# Patient Record
Sex: FEMALE | Race: WHITE
[De-identification: names, ages, dates, MRNs, and addresses within clinical notes are randomized per-mention and may not be internally consistent; named-entity substitution may affect disease eponyms.]

---

## 2017-02-28 ENCOUNTER — HOSPITAL ENCOUNTER (INPATIENT)
Dept: HOSPITAL 79 - MED SURG 4 | Age: 67
LOS: 1 days | Discharge: HOME | DRG: 643 | End: 2017-03-01
Attending: CLINIC/CENTER | Admitting: CLINIC/CENTER
Payer: COMMERCIAL

## 2017-02-28 DIAGNOSIS — Z98.890: ICD-10-CM

## 2017-02-28 DIAGNOSIS — N17.9: ICD-10-CM

## 2017-02-28 DIAGNOSIS — Q61.02: ICD-10-CM

## 2017-02-28 DIAGNOSIS — Z87.891: ICD-10-CM

## 2017-02-28 DIAGNOSIS — E11.65: ICD-10-CM

## 2017-02-28 DIAGNOSIS — I70.1: ICD-10-CM

## 2017-02-28 DIAGNOSIS — Z79.82: ICD-10-CM

## 2017-02-28 DIAGNOSIS — Z83.3: ICD-10-CM

## 2017-02-28 DIAGNOSIS — I10: ICD-10-CM

## 2017-02-28 DIAGNOSIS — Z79.4: ICD-10-CM

## 2017-02-28 DIAGNOSIS — E53.8: ICD-10-CM

## 2017-02-28 DIAGNOSIS — T43.595A: ICD-10-CM

## 2017-02-28 DIAGNOSIS — Z79.899: ICD-10-CM

## 2017-02-28 DIAGNOSIS — F32.9: ICD-10-CM

## 2017-02-28 DIAGNOSIS — J44.9: ICD-10-CM

## 2017-02-28 DIAGNOSIS — E78.5: ICD-10-CM

## 2017-02-28 DIAGNOSIS — Z79.02: ICD-10-CM

## 2017-02-28 DIAGNOSIS — G93.41: ICD-10-CM

## 2017-02-28 DIAGNOSIS — E22.2: Primary | ICD-10-CM

## 2017-02-28 DIAGNOSIS — I65.29: ICD-10-CM

## 2017-02-28 DIAGNOSIS — Z88.5: ICD-10-CM

## 2017-02-28 DIAGNOSIS — Z79.84: ICD-10-CM

## 2017-02-28 DIAGNOSIS — Z88.3: ICD-10-CM

## 2017-02-28 LAB
BUN/CREATININE RATIO: 13 (ref 0–10)
HGB BLD-MCNC: 11.7 GM/DL (ref 12.3–15.3)
RED BLOOD COUNT: 3.97 M/UL (ref 4–5.1)
WHITE BLOOD COUNT: 9.3 K/UL (ref 4.5–11)

## 2017-03-01 LAB — BUN/CREATININE RATIO: 14 (ref 0–10)

## 2021-10-01 ENCOUNTER — HOSPITAL ENCOUNTER (INPATIENT)
Dept: HOSPITAL 79 - CCU | Age: 71
LOS: 13 days | Discharge: NURSING, REHAB OR LONG-TERM CARE | DRG: 682 | End: 2021-10-14
Attending: INTERNAL MEDICINE | Admitting: INTERNAL MEDICINE
Payer: MEDICARE

## 2021-10-01 VITALS — BODY MASS INDEX: 21.49 KG/M2 | HEIGHT: 65 IN | WEIGHT: 129 LBS

## 2021-10-01 DIAGNOSIS — F20.9: ICD-10-CM

## 2021-10-01 DIAGNOSIS — E87.1: ICD-10-CM

## 2021-10-01 DIAGNOSIS — Z90.710: ICD-10-CM

## 2021-10-01 DIAGNOSIS — I50.9: ICD-10-CM

## 2021-10-01 DIAGNOSIS — I27.20: ICD-10-CM

## 2021-10-01 DIAGNOSIS — R53.2: ICD-10-CM

## 2021-10-01 DIAGNOSIS — Z86.73: ICD-10-CM

## 2021-10-01 DIAGNOSIS — E11.22: ICD-10-CM

## 2021-10-01 DIAGNOSIS — I48.92: ICD-10-CM

## 2021-10-01 DIAGNOSIS — F41.9: ICD-10-CM

## 2021-10-01 DIAGNOSIS — Z79.4: ICD-10-CM

## 2021-10-01 DIAGNOSIS — E87.5: ICD-10-CM

## 2021-10-01 DIAGNOSIS — J96.21: ICD-10-CM

## 2021-10-01 DIAGNOSIS — Z79.82: ICD-10-CM

## 2021-10-01 DIAGNOSIS — I13.0: ICD-10-CM

## 2021-10-01 DIAGNOSIS — Z96.643: ICD-10-CM

## 2021-10-01 DIAGNOSIS — Z79.01: ICD-10-CM

## 2021-10-01 DIAGNOSIS — G20: ICD-10-CM

## 2021-10-01 DIAGNOSIS — E87.2: ICD-10-CM

## 2021-10-01 DIAGNOSIS — A04.72: ICD-10-CM

## 2021-10-01 DIAGNOSIS — F32.9: ICD-10-CM

## 2021-10-01 DIAGNOSIS — J18.9: ICD-10-CM

## 2021-10-01 DIAGNOSIS — Z74.01: ICD-10-CM

## 2021-10-01 DIAGNOSIS — E44.0: ICD-10-CM

## 2021-10-01 DIAGNOSIS — N39.0: ICD-10-CM

## 2021-10-01 DIAGNOSIS — B95.2: ICD-10-CM

## 2021-10-01 DIAGNOSIS — I08.1: ICD-10-CM

## 2021-10-01 DIAGNOSIS — F02.80: ICD-10-CM

## 2021-10-01 DIAGNOSIS — I44.7: ICD-10-CM

## 2021-10-01 DIAGNOSIS — N17.0: Primary | ICD-10-CM

## 2021-10-01 DIAGNOSIS — K21.9: ICD-10-CM

## 2021-10-01 DIAGNOSIS — E78.5: ICD-10-CM

## 2021-10-01 DIAGNOSIS — E87.3: ICD-10-CM

## 2021-10-01 DIAGNOSIS — Z20.822: ICD-10-CM

## 2021-10-01 DIAGNOSIS — N18.30: ICD-10-CM

## 2021-10-01 DIAGNOSIS — A41.81: ICD-10-CM

## 2021-10-01 DIAGNOSIS — D63.1: ICD-10-CM

## 2021-10-01 DIAGNOSIS — I48.91: ICD-10-CM

## 2021-10-01 DIAGNOSIS — L89.152: ICD-10-CM

## 2021-10-01 DIAGNOSIS — D50.9: ICD-10-CM

## 2021-10-01 LAB — BUN/CREATININE RATIO: 13 (ref 0–10)

## 2021-10-01 PROCEDURE — A6212 FOAM DRG <=16 SQ IN W/BORDER: HCPCS

## 2021-10-01 PROCEDURE — P9047 ALBUMIN (HUMAN), 25%, 50ML: HCPCS

## 2021-10-01 PROCEDURE — U0002 COVID-19 LAB TEST NON-CDC: HCPCS

## 2021-10-01 PROCEDURE — P9016 RBC LEUKOCYTES REDUCED: HCPCS

## 2021-10-02 LAB
BUN/CREATININE RATIO: 12 (ref 0–10)
HGB BLD-MCNC: 8.7 GM/DL (ref 12.3–15.3)
RED BLOOD COUNT: 2.85 M/UL (ref 4–5.1)
WHITE BLOOD COUNT: 10.4 K/UL (ref 4.5–11)

## 2021-10-03 LAB
BUN/CREATININE RATIO: 10 (ref 0–10)
HGB BLD-MCNC: 8.9 GM/DL (ref 12.3–15.3)
RED BLOOD COUNT: 2.87 M/UL (ref 4–5.1)
WHITE BLOOD COUNT: 8.8 K/UL (ref 4.5–11)

## 2021-10-03 NOTE — NUR
PATIENT HAD CRITICAL LAB OF POTASSIUM 2.7. LAB VALUE WAS REPORTED TO PROVIDER
WHO ORDERED HER POTASSIUM TO BE REPLACED PER PROTOCOL. WILL CONTINUE TO
MONITOR.

## 2021-10-04 LAB
BUN/CREATININE RATIO: 10 (ref 0–10)
HGB BLD-MCNC: 9.1 GM/DL (ref 12.3–15.3)
RED BLOOD COUNT: 2.96 M/UL (ref 4–5.1)
WHITE BLOOD COUNT: 8.3 K/UL (ref 4.5–11)

## 2021-10-04 NOTE — NUR
CALLED TO REPORT A BLOOD PRESSURE /103 TO DOCTOR VARSHA. SHE STATED "OK,
LETS MONITOR IT." WILL CONTINUE TO MONITOR.

## 2021-10-04 NOTE — NUR
2030 FOUND PT IS VOMIT, LAID OVER ON HER SIDE SET UP SUCTION AND CLEARED
MOUTH AND SAT HER UP STRAIGHT. PT SOUNDED WET.
 V/S
/49

RR 38
O2 95
DR. OTTO NOTIFIED; ORDER FOR STAT CHEST XRAY, ABG, PRO BNP
DR. MANRIQUEZ STOPPED IN AND ORDERED FLUIDS DC'D
CRITICAL LABS REPORTED AND DR. OTTO NOTIFIED. ORDER TO INCREASE OXYGEN AND
TITRATE TO KEEP GREATER THAN 92%, AND 0.5MG ATIVAN PO ONE TIME.
PT RR HAVE DECREASED TO 26/MIN AND 0.5 ATIVAN WILL BE GIVEN.

## 2021-10-04 NOTE — NUR
CRITICAL LACTIC 47.[address] MANRIQUEZ KETONES NEGATIVE.
HR 73
O2 98%
RR 32
/71
ORDERS: BLOOD CULTURES X2 UA MICROSCOPIC, URINE CULTURE. MERREM 1G Q8 FOR
SEVERE SEPSIS PRESUMABLY LUNG, PHARMACY TO ADJUST DOSE AS NEEDED. DR. MANRIQUEZ ASK
THAT I MAKE DR. OTTO AWARE OF FINDINGS. I HAVE TRIED TO CALL HIM TWICE WITH
NO ANSWER. WILL KEEP TRYING TO LET HIM KNOW THE ABOVE ORDERS FROM DR. MANRIQUEZ.

## 2021-10-05 LAB
BUN/CREATININE RATIO: 11 (ref 0–10)
BUN/CREATININE RATIO: 11 (ref 0–10)
HGB BLD-MCNC: 8.4 GM/DL (ref 12.3–15.3)
RED BLOOD COUNT: 2.74 M/UL (ref 4–5.1)
WHITE BLOOD COUNT: 16.8 K/UL (ref 4.5–11)

## 2021-10-06 LAB
BUN/CREATININE RATIO: 13 (ref 0–10)
CLOSTRIDIUM DIFFICILE TOX A/B: DETECTED
HGB BLD-MCNC: 8.6 GM/DL (ref 12.3–15.3)
RED BLOOD COUNT: 2.84 M/UL (ref 4–5.1)
WHITE BLOOD COUNT: 13.1 K/UL (ref 4.5–11)

## 2021-10-07 LAB
BUN/CREATININE RATIO: 13 (ref 0–10)
HGB BLD-MCNC: 9.6 GM/DL (ref 12.3–15.3)
RED BLOOD COUNT: 3.11 M/UL (ref 4–5.1)
WHITE BLOOD COUNT: 18.4 K/UL (ref 4.5–11)

## 2021-10-08 LAB
BUN/CREATININE RATIO: 13 (ref 0–10)
BUN/CREATININE RATIO: 13 (ref 0–10)
HGB BLD-MCNC: 7.8 GM/DL (ref 12.3–15.3)
RED BLOOD COUNT: 2.54 M/UL (ref 4–5.1)
WHITE BLOOD COUNT: 13.2 K/UL (ref 4.5–11)

## 2021-10-09 LAB
BUN/CREATININE RATIO: 13 (ref 0–10)
HGB BLD-MCNC: 8 GM/DL (ref 12.3–15.3)
RED BLOOD COUNT: 2.59 M/UL (ref 4–5.1)
WHITE BLOOD COUNT: 13 K/UL (ref 4.5–11)

## 2021-10-10 LAB
BUN/CREATININE RATIO: 15 (ref 0–10)
HGB BLD-MCNC: 8.6 GM/DL (ref 12.3–15.3)
RED BLOOD COUNT: 2.84 M/UL (ref 4–5.1)
WHITE BLOOD COUNT: 14.4 K/UL (ref 4.5–11)

## 2021-10-11 LAB
BUN/CREATININE RATIO: 15 (ref 0–10)
HGB BLD-MCNC: 9 GM/DL (ref 12.3–15.3)
RED BLOOD COUNT: 2.92 M/UL (ref 4–5.1)
WHITE BLOOD COUNT: 14.5 K/UL (ref 4.5–11)

## 2021-10-12 LAB
BUN/CREATININE RATIO: 13 (ref 0–10)
BUN/CREATININE RATIO: 14 (ref 0–10)
CLOSTRIDIUM DIFFICILE TOX A/B: DETECTED
HGB BLD-MCNC: 7.4 GM/DL (ref 12.3–15.3)
HGB BLD-MCNC: 8.3 GM/DL (ref 12.3–15.3)
RED BLOOD COUNT: 2.47 M/UL (ref 4–5.1)
RED BLOOD COUNT: 2.69 M/UL (ref 4–5.1)
WHITE BLOOD COUNT: 12 K/UL (ref 4.5–11)
WHITE BLOOD COUNT: 12.3 K/UL (ref 4.5–11)

## 2021-10-12 PROCEDURE — B24BZZZ ULTRASONOGRAPHY OF HEART WITH AORTA: ICD-10-PCS | Performed by: INTERNAL MEDICINE

## 2021-10-13 LAB
BUN/CREATININE RATIO: 15 (ref 0–10)
HGB BLD-MCNC: 7.2 GM/DL (ref 12.3–15.3)
RED BLOOD COUNT: 2.35 M/UL (ref 4–5.1)
WHITE BLOOD COUNT: 13.9 K/UL (ref 4.5–11)

## 2021-10-13 PROCEDURE — 30233N1 TRANSFUSION OF NONAUTOLOGOUS RED BLOOD CELLS INTO PERIPHERAL VEIN, PERCUTANEOUS APPROACH: ICD-10-PCS | Performed by: INTERNAL MEDICINE

## 2021-10-14 LAB
BUN/CREATININE RATIO: 13 (ref 0–10)
HGB BLD-MCNC: 9.7 GM/DL (ref 12.3–15.3)
RED BLOOD COUNT: 3.08 M/UL (ref 4–5.1)
WHITE BLOOD COUNT: 12.4 K/UL (ref 4.5–11)

## 2021-10-15 ENCOUNTER — HOSPITAL ENCOUNTER (INPATIENT)
Dept: HOSPITAL 79 - MED SURG 4 | Age: 71
LOS: 23 days | Discharge: NURSING, REHAB OR LONG-TERM CARE | DRG: 280 | End: 2021-11-07
Attending: INTERNAL MEDICINE | Admitting: STUDENT IN AN ORGANIZED HEALTH CARE EDUCATION/TRAINING PROGRAM
Payer: MEDICARE

## 2021-10-15 VITALS — BODY MASS INDEX: 18.58 KG/M2 | HEIGHT: 65 IN | WEIGHT: 111.5 LBS

## 2021-10-15 DIAGNOSIS — Z88.1: ICD-10-CM

## 2021-10-15 DIAGNOSIS — Z79.01: ICD-10-CM

## 2021-10-15 DIAGNOSIS — J96.21: ICD-10-CM

## 2021-10-15 DIAGNOSIS — I69.351: ICD-10-CM

## 2021-10-15 DIAGNOSIS — I44.0: ICD-10-CM

## 2021-10-15 DIAGNOSIS — F03.90: ICD-10-CM

## 2021-10-15 DIAGNOSIS — D63.1: ICD-10-CM

## 2021-10-15 DIAGNOSIS — N18.30: ICD-10-CM

## 2021-10-15 DIAGNOSIS — E11.65: ICD-10-CM

## 2021-10-15 DIAGNOSIS — I48.0: ICD-10-CM

## 2021-10-15 DIAGNOSIS — R53.2: ICD-10-CM

## 2021-10-15 DIAGNOSIS — J44.9: ICD-10-CM

## 2021-10-15 DIAGNOSIS — K21.9: ICD-10-CM

## 2021-10-15 DIAGNOSIS — Z90.49: ICD-10-CM

## 2021-10-15 DIAGNOSIS — Z99.81: ICD-10-CM

## 2021-10-15 DIAGNOSIS — Z88.5: ICD-10-CM

## 2021-10-15 DIAGNOSIS — G93.41: ICD-10-CM

## 2021-10-15 DIAGNOSIS — L89.151: ICD-10-CM

## 2021-10-15 DIAGNOSIS — E87.6: ICD-10-CM

## 2021-10-15 DIAGNOSIS — J18.9: ICD-10-CM

## 2021-10-15 DIAGNOSIS — Z96.643: ICD-10-CM

## 2021-10-15 DIAGNOSIS — Z20.822: ICD-10-CM

## 2021-10-15 DIAGNOSIS — A04.72: ICD-10-CM

## 2021-10-15 DIAGNOSIS — I50.33: ICD-10-CM

## 2021-10-15 DIAGNOSIS — Z51.5: ICD-10-CM

## 2021-10-15 DIAGNOSIS — D50.9: ICD-10-CM

## 2021-10-15 DIAGNOSIS — I13.0: Primary | ICD-10-CM

## 2021-10-15 DIAGNOSIS — I21.A1: ICD-10-CM

## 2021-10-15 DIAGNOSIS — Z79.82: ICD-10-CM

## 2021-10-15 DIAGNOSIS — Z88.8: ICD-10-CM

## 2021-10-15 DIAGNOSIS — I45.81: ICD-10-CM

## 2021-10-15 DIAGNOSIS — I95.9: ICD-10-CM

## 2021-10-15 DIAGNOSIS — E11.649: ICD-10-CM

## 2021-10-15 DIAGNOSIS — Z79.4: ICD-10-CM

## 2021-10-15 DIAGNOSIS — I27.20: ICD-10-CM

## 2021-10-15 DIAGNOSIS — F20.9: ICD-10-CM

## 2021-10-15 DIAGNOSIS — E87.1: ICD-10-CM

## 2021-10-15 DIAGNOSIS — Z87.440: ICD-10-CM

## 2021-10-15 DIAGNOSIS — N17.9: ICD-10-CM

## 2021-10-15 DIAGNOSIS — Z90.710: ICD-10-CM

## 2021-10-15 DIAGNOSIS — Z87.01: ICD-10-CM

## 2021-10-15 DIAGNOSIS — E87.4: ICD-10-CM

## 2021-10-15 DIAGNOSIS — E87.3: ICD-10-CM

## 2021-10-15 PROCEDURE — U0002 COVID-19 LAB TEST NON-CDC: HCPCS

## 2021-10-15 PROCEDURE — A6212 FOAM DRG <=16 SQ IN W/BORDER: HCPCS

## 2021-10-17 LAB
BUN/CREATININE RATIO: 15 (ref 0–10)
HGB BLD-MCNC: 9.7 GM/DL (ref 12.3–15.3)
RED BLOOD COUNT: 3.09 M/UL (ref 4–5.1)
WHITE BLOOD COUNT: 15.1 K/UL (ref 4.5–11)

## 2021-10-18 LAB
BUN/CREATININE RATIO: 15 (ref 0–10)
HGB BLD-MCNC: 9.1 GM/DL (ref 12.3–15.3)
RED BLOOD COUNT: 2.9 M/UL (ref 4–5.1)
WHITE BLOOD COUNT: 12.2 K/UL (ref 4.5–11)

## 2021-10-18 NOTE — NUR
PATIENT HAD CRITICAL LAB POTASSIUM OF 2.9. PATIENTS MORNING LABS HAD ALSO COME
BACK WITH A POTASSIUM LEVEL OF 2.9. SHE HAD RECIEVED POTASSIUM PROTOCOLL,
GETTING 20MeQ AT 0600,0900, AND 1200 WITH A LAB CHECK AT 1400. PROVIDER WAS
NOTIFIED AND ORDERED A RECHECK BEFOR ADMINISTERING MORE POTASSIUM.

## 2021-10-19 LAB
BUN/CREATININE RATIO: 13 (ref 0–10)
HGB BLD-MCNC: 9.2 GM/DL (ref 12.3–15.3)
RED BLOOD COUNT: 3.04 M/UL (ref 4–5.1)
WHITE BLOOD COUNT: 12.2 K/UL (ref 4.5–11)

## 2021-10-20 LAB
BUN/CREATININE RATIO: 12 (ref 0–10)
HGB BLD-MCNC: 8.4 GM/DL (ref 12.3–15.3)
RED BLOOD COUNT: 2.7 M/UL (ref 4–5.1)
WHITE BLOOD COUNT: 11.6 K/UL (ref 4.5–11)

## 2021-10-21 LAB
BUN/CREATININE RATIO: 12 (ref 0–10)
HGB BLD-MCNC: 8.2 GM/DL (ref 12.3–15.3)
RED BLOOD COUNT: 2.67 M/UL (ref 4–5.1)
WHITE BLOOD COUNT: 11.1 K/UL (ref 4.5–11)

## 2021-10-22 LAB
BUN/CREATININE RATIO: 12 (ref 0–10)
HGB BLD-MCNC: 8.5 GM/DL (ref 12.3–15.3)
RED BLOOD COUNT: 2.75 M/UL (ref 4–5.1)
WHITE BLOOD COUNT: 13.1 K/UL (ref 4.5–11)

## 2021-10-23 LAB
BUN/CREATININE RATIO: 11 (ref 0–10)
HGB BLD-MCNC: 8.9 GM/DL (ref 12.3–15.3)
RED BLOOD COUNT: 2.86 M/UL (ref 4–5.1)
WHITE BLOOD COUNT: 12.9 K/UL (ref 4.5–11)

## 2021-10-23 NOTE — NUR
0634: NOTIFIED OF CRITCAL K+ RESULT BY LAB. PROTOCOL ALREADY ORDERED. WILL
PASS ONTO AM RN TO REPLACE.

## 2021-10-24 LAB
BUN/CREATININE RATIO: 12 (ref 0–10)
HGB BLD-MCNC: 8.8 GM/DL (ref 12.3–15.3)
RED BLOOD COUNT: 2.87 M/UL (ref 4–5.1)
WHITE BLOOD COUNT: 11.3 K/UL (ref 4.5–11)

## 2021-10-25 LAB
BUN/CREATININE RATIO: 11 (ref 0–10)
HGB BLD-MCNC: 9.1 GM/DL (ref 12.3–15.3)
RED BLOOD COUNT: 2.92 M/UL (ref 4–5.1)
WHITE BLOOD COUNT: 9.6 K/UL (ref 4.5–11)

## 2021-10-25 NOTE — NUR
PT CRITICAL POTASSIUM OF 2.9. THIS RN NOTIFIED PROVIDER VIA TEXT AND STARTED
POTASSIUM PROTOCOL PER MAR. 1 OF 3 POTASSIUM PROVIDED. NEXT DOSE AT 0600.

## 2021-10-26 LAB
BUN/CREATININE RATIO: 12 (ref 0–10)
HGB BLD-MCNC: 10.9 GM/DL (ref 12.3–15.3)
RED BLOOD COUNT: 3.51 M/UL (ref 4–5.1)
WHITE BLOOD COUNT: 12.6 K/UL (ref 4.5–11)

## 2021-10-27 LAB
BUN/CREATININE RATIO: 12 (ref 0–10)
BUN/CREATININE RATIO: 13 (ref 0–10)
CREATININE, URINE: 24.3 MG/DL
HGB BLD-MCNC: 10.4 GM/DL (ref 12.3–15.3)
HGB BLD-MCNC: 9.6 GM/DL (ref 12.3–15.3)
RED BLOOD COUNT: 3.14 M/UL (ref 4–5.1)
RED BLOOD COUNT: 3.41 M/UL (ref 4–5.1)
WHITE BLOOD COUNT: 11.5 K/UL (ref 4.5–11)
WHITE BLOOD COUNT: 12.7 K/UL (ref 4.5–11)

## 2021-10-27 NOTE — NUR
PATIENT HAD A TREAT AND EVAL WITH PT. BP WAS LOW WHILE SITTING AND PT CALLED
NURSE AND TECH IN TO ASSIST WITH PATIENT. PATIENT HAD NEW ONSET PANTING AND
BRADYCHARDIA. BP /46 O2 SATS WERE 100. PATIENT WAS IN DISTRESS AND
PROVIDER WAS NOTIFIED. PROVIDER PUT IN SEVERAL LABS FOR PATIENT INCLUDING
ABG'S AND AN EKG. PROVIDER CAME TO SEE PATIENT AND EXAMINED HER. NURSE HAS
BEEN TALKING WITH PROVIDER ABOUT PATIENT AND STAYING CLOSE TO PATIENT DURING
THIS EPISODE. GAVE TYLENOL PER PROVIDERS ORDER AND STARTED PATIENT ON
SCHEDULED ANTIBIOTIC. PATIENT HELD HER RIGHT HAND CLENCHED AND WAS OBSERVED BY
PROVIDER. WILL CONTINUE TO MONITOR.

## 2021-10-28 LAB
BUN/CREATININE RATIO: 13 (ref 0–10)
HGB BLD-MCNC: 10.1 GM/DL (ref 12.3–15.3)
RED BLOOD COUNT: 3.37 M/UL (ref 4–5.1)
WHITE BLOOD COUNT: 12.4 K/UL (ref 4.5–11)

## 2021-10-28 NOTE — NUR
Patient has been yelling "oh, oh, oh" but she is only verbal intermittenly and
was unable to tell me if or what was hurting. I thought she might have had a
bowel movement. When I changed her she had a med bowel movement with severe
excoriation to the groin, jeremy, anal, and buttock areas. The jeremy area is
swollen and the patient had a very thick discharge. I washed her with soapy
water and applied barrier cream. I will inform the dayshift nurse to be sure
and check the area. I will call and inform the hospitalist for the need for
nystatin power.

## 2021-10-29 LAB
ANTI-DSDNA ANTIBODIES: <1 IU/ML (ref 0–9)
BUN/CREATININE RATIO: 11 (ref 0–10)
COMPLEMENT C3, SERUM: 111 MG/DL (ref 82–167)
COMPLEMENT C4, SERUM: 31 MG/DL (ref 12–38)
HGB BLD-MCNC: 9 GM/DL (ref 12.3–15.3)
RED BLOOD COUNT: 2.95 M/UL (ref 4–5.1)
WHITE BLOOD COUNT: 11.6 K/UL (ref 4.5–11)

## 2021-10-30 LAB
BUN/CREATININE RATIO: 14 (ref 0–10)
HGB BLD-MCNC: 9.1 GM/DL (ref 12.3–15.3)
RED BLOOD COUNT: 2.94 M/UL (ref 4–5.1)
WHITE BLOOD COUNT: 12.6 K/UL (ref 4.5–11)

## 2021-10-31 LAB
BUN/CREATININE RATIO: 13 (ref 0–10)
HGB BLD-MCNC: 9.5 GM/DL (ref 12.3–15.3)
RED BLOOD COUNT: 3.07 M/UL (ref 4–5.1)
WHITE BLOOD COUNT: 12.1 K/UL (ref 4.5–11)

## 2021-11-01 LAB
A/G RATIO: 0.8 (ref 0.7–1.7)
ALBUMIN: 2.8 G/DL (ref 2.9–4.4)
ALPHA-1-GLOBULIN: 0.2 G/DL (ref 0–0.4)
ALPHA-2-GLOBULIN: 1.1 G/DL (ref 0.4–1)
ANTIMYELOPEROXIDASE (MPO) ABS: <9 U/ML (ref 0–9)
ANTIPROTEINASE 3 (PR-3) ABS: <3.5 U/ML (ref 0–3.5)
ATYPICAL PANCA: (no result) TITER
BETA GLOBULIN: 1 G/DL (ref 0.7–1.3)
BUN/CREATININE RATIO: 13 (ref 0–10)
CYTOPLASMIC (C-ANCA): (no result) TITER
GAMMA GLOBULIN: 1.2 G/DL (ref 0.4–1.8)
GLOBULIN, TOTAL: 3.6 G/DL (ref 2.2–3.9)
HGB BLD-MCNC: 8.3 GM/DL (ref 12.3–15.3)
IMMUNOGLOBULIN G, QN, SERUM: 1077 MG/DL (ref 586–1602)
IMMUNOGLOBULIN M, QN, SERUM: 175 MG/DL (ref 26–217)
PERINUCLEAR (P-ANCA): (no result) TITER
PROTEIN, TOTAL, SERUM: 6.4 G/DL (ref 6–8.5)
RED BLOOD COUNT: 2.66 M/UL (ref 4–5.1)
WHITE BLOOD COUNT: 11.7 K/UL (ref 4.5–11)

## 2021-11-02 LAB
BUN/CREATININE RATIO: 14 (ref 0–10)
HGB BLD-MCNC: 8.3 GM/DL (ref 12.3–15.3)
RED BLOOD COUNT: 2.65 M/UL (ref 4–5.1)
WHITE BLOOD COUNT: 17.3 K/UL (ref 4.5–11)

## 2021-11-03 LAB
BUN/CREATININE RATIO: 14 (ref 0–10)
HGB BLD-MCNC: 9 GM/DL (ref 12.3–15.3)
RED BLOOD COUNT: 2.94 M/UL (ref 4–5.1)
WHITE BLOOD COUNT: 18 K/UL (ref 4.5–11)

## 2021-11-04 LAB
ATYPICAL PANCA: (no result) TITER
CYTOPLASMIC (C-ANCA): (no result) TITER
PERINUCLEAR (P-ANCA): (no result) TITER

## 2021-11-05 LAB
BUN/CREATININE RATIO: 15 (ref 0–10)
HGB BLD-MCNC: 8.3 GM/DL (ref 12.3–15.3)
RED BLOOD COUNT: 2.71 M/UL (ref 4–5.1)
WHITE BLOOD COUNT: 14.9 K/UL (ref 4.5–11)

## 2021-11-06 LAB
BUN/CREATININE RATIO: 17 (ref 0–10)
HGB BLD-MCNC: 8.4 GM/DL (ref 12.3–15.3)
RED BLOOD COUNT: 2.73 M/UL (ref 4–5.1)
WHITE BLOOD COUNT: 14.6 K/UL (ref 4.5–11)

## 2021-11-07 LAB
BUN/CREATININE RATIO: 17 (ref 0–10)
HGB BLD-MCNC: 8.4 GM/DL (ref 12.3–15.3)
RED BLOOD COUNT: 2.68 M/UL (ref 4–5.1)
WHITE BLOOD COUNT: 18.8 K/UL (ref 4.5–11)

## 2022-01-23 NOTE — NUR
1106 SEE CODE RECORD
NOTIFIED ALL MDS
AT 2000 PT BECAME INCREASINGLY SHORT OF BREATH. MD CONTACTED. CXY AND ABG
ORGERED ALONG WITH BIPAP. CXY SHOWED PULM EDEMA. MD ORDERED FOR PT TO BE
INTUBATED.NEW 20 IV STARTED IN RAC. 20 ETAMIDATE GIVEN AT 2037. 80 ROBERT GIVEN
ALSO AT 2037. PT INTUBATED AT 2041, 22@LIP. EQUAL BREATH SOUNDS BILATERAL.
SECOND CXY CONFIRMED PLACEMENT. DIPRIVAN STARTED AT 5MCG PER KG PER HOUR PER
PROTOCOL. PT TRANSFERED TO CRITICAL CARE.
headache